# Patient Record
Sex: FEMALE | Race: WHITE | URBAN - NONMETROPOLITAN AREA
[De-identification: names, ages, dates, MRNs, and addresses within clinical notes are randomized per-mention and may not be internally consistent; named-entity substitution may affect disease eponyms.]

---

## 2019-12-12 ENCOUNTER — APPOINTMENT (RX ONLY)
Dept: URBAN - NONMETROPOLITAN AREA CLINIC 13 | Facility: CLINIC | Age: 64
Setting detail: DERMATOLOGY
End: 2019-12-12

## 2019-12-12 DIAGNOSIS — Z41.9 ENCOUNTER FOR PROCEDURE FOR PURPOSES OTHER THAN REMEDYING HEALTH STATE, UNSPECIFIED: ICD-10-CM

## 2019-12-12 PROCEDURE — ? BOTOX

## 2019-12-12 PROCEDURE — ? RESTYLANE REFYNE INJECTION

## 2019-12-12 NOTE — PROCEDURE: BOTOX
Additional Area 1 Location: [facility]
Show Mentalis Units: No
Additional Area 2 Units: 0
Show Nasal Units: Yes
Additional Area 6 Location: chin
Forehead Units: 5
Consent: Written consent obtained. Risks include but not limited to lid/brow ptosis, bruising, swelling, diplopia, temporary effect, incomplete chemical denervation.
Additional Area 3 Location: bunny lines
Additional Area 1 Units: 15
Glabellar Complex Units: 25
Additional Area 5 Location: brow
Expiration Date (Month Year): 5/22
Detail Level: Zone
Additional Area 2 Location: masseter, L and R
Lot #: 
Post-Care Instructions: Patient instructed to not lie down for 4 hours and limit physical activity for 24 hours. Patient instructed not to travel by airplane for 48 hours.
Price (Use Numbers Only, No Special Characters Or $): 883
Dilution (U/0.1 Cc): 1
Additional Area 4 Location: lip upper

## 2019-12-12 NOTE — PROCEDURE: RESTYLANE REFYNE INJECTION
Procedural Text: The filler was administered to the treatment areas noted above.
Consent: Written consent obtained. Risks include but not limited to bruising, beading, irregular texture, ulceration, infection, allergic reaction, scar formation, incomplete augmentation, temporary nature, procedural pain.
Additional Area 3 Volume In Cc: 0
Anesthesia Volume In Cc: 0.5
Additional Area 2 Location: perioral
Lot #: [id number]
Include Cannula Information In Note?: No
Price (Use Numbers Only, No Special Characters Or $): 207
Post-Care Instructions: Patient instructed to apply ice to reduce swelling.
Map Statement: See Attach Map for Complete Details
Additional Area 5 Location: chin
Filler: Restylane Refyne
Anesthesia Type: 1% lidocaine with epinephrine
Detail Level: Detailed
Additional Area 3 Location: corner of mouth
Expiration Date (Month Year): 9/20
Additional Anesthesia Volume In Cc: 6
Additional Area 1 Location: lower face rhytides
Additional Area 1 Volume In Cc: 1